# Patient Record
Sex: FEMALE | Race: WHITE | Employment: STUDENT | URBAN - NONMETROPOLITAN AREA
[De-identification: names, ages, dates, MRNs, and addresses within clinical notes are randomized per-mention and may not be internally consistent; named-entity substitution may affect disease eponyms.]

---

## 2018-10-09 ENCOUNTER — OFFICE VISIT (OUTPATIENT)
Dept: OTOLARYNGOLOGY | Facility: OTHER | Age: 23
End: 2018-10-09
Attending: PHYSICIAN ASSISTANT
Payer: COMMERCIAL

## 2018-10-09 VITALS
SYSTOLIC BLOOD PRESSURE: 120 MMHG | DIASTOLIC BLOOD PRESSURE: 74 MMHG | BODY MASS INDEX: 25.77 KG/M2 | WEIGHT: 180 LBS | TEMPERATURE: 97.4 F | OXYGEN SATURATION: 98 % | HEART RATE: 78 BPM | HEIGHT: 70 IN

## 2018-10-09 DIAGNOSIS — J30.0 NON-ALLERGIC VASOMOTOR RHINITIS: ICD-10-CM

## 2018-10-09 DIAGNOSIS — J31.0 CHRONIC RHINITIS: Primary | ICD-10-CM

## 2018-10-09 DIAGNOSIS — L30.9 DERMATITIS: ICD-10-CM

## 2018-10-09 DIAGNOSIS — H10.13 ALLERGIC CONJUNCTIVITIS, BILATERAL: ICD-10-CM

## 2018-10-09 DIAGNOSIS — R21 FACIAL RASH: ICD-10-CM

## 2018-10-09 PROCEDURE — 99213 OFFICE O/P EST LOW 20 MIN: CPT | Performed by: PHYSICIAN ASSISTANT

## 2018-10-09 RX ORDER — MONTELUKAST SODIUM 10 MG/1
10 TABLET ORAL AT BEDTIME
COMMUNITY

## 2018-10-09 RX ORDER — OLOPATADINE HYDROCHLORIDE 1 MG/ML
1 SOLUTION/ DROPS OPHTHALMIC 2 TIMES DAILY
COMMUNITY
End: 2020-06-12

## 2018-10-09 ASSESSMENT — PAIN SCALES - GENERAL: PAINLEVEL: NO PAIN (0)

## 2018-10-09 NOTE — PATIENT INSTRUCTIONS
Continue with current medications.   Return for allergy testing.   Hold Xyzal and patanol prior to allergy testing  Consider further allergy testing (food) if no improvement w/ lips.   Continue with Aquaphor and cool compresses    Thank you for allowing NAUN Sanabria and our ENT team to participate in your care.  If your medications are too expensive, please give the nurse a call.  We can possibly change this medication.  If you have a scheduling or an appointment question please contact our Health Unit Coordinator at their direct line [phone].   ALL nursing questions or concerns can be directed to your ENT nurse at: [phone] Carmen

## 2018-10-09 NOTE — LETTER
10/9/2018         RE: Hoang Gray  [address] [place] [address]        Dear Colleague,    Thank you for referring your patient, Hoang Gray, to the [facility]. Please see a copy of my visit note below.    Chief Complaint   Patient presents with     RECHECK     Follow Up Chronic Rhinitis, Seasonal Allergic Rhinitis, Allergic Conjunctivitis, Dermatitis, Facial Rash     Hoang has noticed an increase in symptoms of eye irritation, facial rashes.   She has noticed around her lips she will develop oral itching/ swelling. Hoang has rashes increased around her lips, eyes.   She was seen at [facility] and started on Prednisone and eye drops. She was further placed on Singulair as well.   She was doing well with Flonase and Xyzal and noted improvement. She has noticed symptoms improving with her eyes but no great relief with her skin.     Roommate has thieves  contributing to possible exposure. Past hx of boyfriend's cologne causing her symptoms. She did not return for MQT following her last appointment as she was improved.     Hoang has been using BID Claritin, Benadryl. She was treated with hydrocortisone cream. She has patanol for as needed symptoms. She has eyes with tearing, itching, lateral canthus with dry scaly skin. She had dry chapped lips as well. She has been using Aveeno unscented soaps.  [facility] She has worsening symptoms pending duration of exposure especially his cologne or walking down soap aisles in store.      Hoang has no hx of allergy testing. Her mother has seasonal allergies.   She is living in a dorm now and her roommate does use essential oils.       Denies oral irritation besides lips. Denies food concerns. Denies reflux. No dysphagia or dysphonia. No dyspnea.   History reviewed. No pertinent past medical history.     Allergies   Allergen Reactions     Cats      Seasonal Allergies   "    Current Outpatient Prescriptions   Medication     fluticasone (FLONASE) 50 MCG/ACT spray     Levocetirizine Dihydrochloride (XYZAL PO)     Levothyroxine Sodium (LEVOTHROID PO)     montelukast (SINGULAIR) 10 MG tablet     olopatadine (PATANOL) 0.1 % ophthalmic solution     No current facility-administered medications for this visit.       ROS: 10 point ROS neg other than the symptoms noted above in the HPI.  /74 (Cuff Size: Adult Regular)  Pulse 78  Temp 97.4  F (36.3  C) (Tympanic)  Ht 5' 10\" (1.778 m)  Wt 180 lb (81.6 kg)  SpO2 98%  BMI 25.83 kg/m2  General - The patient is well nourished and well developed, and appears to have good nutritional status.  Alert and interactive.  Head and Face - Normocephalic and atraumatic, with no gross asymmetry noted.  The facial nerve is intact.  Voice and Breathing - The patient was breathing comfortably without the use of accessory muscles. There was no wheezing or stridor.     Neck-neck is supple there is no worrisome palpable lymphadenopathy  Ears -The external auditory canals are patent, the tympanic membranes are intact without effusion or worrisome retraction    Mouth - Examination of the oral cavity showed pink, healthy oral mucosa. No lesions or ulcerations noted.  The tongue was mobile and midline.  Nose - Nasal mucosa is pink and moist with no abnormal mucus or discharge.  Throat - The palate is intact without cleft palate or obvious bifid uvula.  The tonsillar pillars and soft palate were symmetric.  Tonsils are grade 3, cryptic.        ASSESSMENT:    ICD-10-CM    1. Chronic rhinitis J31.0    2. Non-allergic vasomotor rhinitis J30.0    3. Allergic conjunctivitis, bilateral H10.13    4. Dermatitis L30.9    5. Facial rash R21            Discussed with patient, I am not able to test in relation to perfume, scents, etc. She should work on avoidance.   May need to have roommate stop use of oils, thieves for a period of time. Symptoms related to non-allergic " rhinitis.   She may return for MQT. This is being completed for seasonal allergies. She may consider additional therapy pending results, SCIT/SLIT.    May use Nasal saline and Aquaphor for nasal dryness. Use Aquaphor to lips. I am not sure of an oral treatment for lips.   Monitor for food contributing. Briefly discussed oral allergy.   Consider Astelin or patanase after allergy test.   She may also consider further steroid ointment for PRN exposure.    Consider Maxitrol for outer eye concerns  Indications for allergy testing include:    1) Confirm suspicion of allergic rhinitis due to inhalant allergies  2) Identify the offending allergen to determine specific mode of treatment  3) In the case of chronic rhinosinusitis: when symptoms are not controlled by avoidance and pharmacotherapy  4) In the Asthma patient when exacerbations may be due to perennial allergen exposure  5) Suspect food allergy  6) Otitis Media, chronic rhinitis, atopic dermatitis, Meniere disease, headache, pharyngitis or eye symptoms        Modified quantitative testing (MQT) will be performed.  Signed consent was obtained, and the risks of immunotherapy were discussed, including the potential for anaphylaxis.     If immunotherapy (IT) is recommended, there is continued risk of anaphylaxis.   Anaphylaxis can cause death. The patient will need to be monitored for 30 minutes post injection.  They must present their epinephrine pen prior to injection.  Subcutaneous as well as sublingual immunotherapy (SLIT) were discussed as potential treatment options.  The patient was told SLIT is not approved by the FDA and is cash pay.  The general time frame of immunotherapy was discussed (generally 3-5 years, sometimes longer), and the basic immunology behind IT was discussed.     Katya Alcazar PA-C  ENT  [facility] [place]  [phone]       Again, thank you for allowing me to participate in the care of your patient.        Sincerely,        Katya Alcazar  RENETTA

## 2018-10-09 NOTE — MR AVS SNAPSHOT
After Visit Summary   10/9/2018    Hoang Gray    MRN: [id number]           Patient Information     Date Of Birth          1995        Visit Information        Provider Department      10/9/2018 10:45 AM Katya Alcazar PA-C [facility]        Today's Diagnoses     Chronic rhinitis    -  1    Non-allergic vasomotor rhinitis        Allergic conjunctivitis, bilateral        Dermatitis        Facial rash          Care Instructions    Continue with current medications.   Return for allergy testing.   Hold Xyzal and patanol prior to allergy testing  Consider further allergy testing (food) if no improvement w/ lips.   Continue with Aquaphor and cool compresses    Thank you for allowing NAUN Sanabria and our ENT team to participate in your care.  If your medications are too expensive, please give the nurse a call.  We can possibly change this medication.  If you have a scheduling or an appointment question please contact our Health Unit Coordinator at their direct line [phone].   ALL nursing questions or concerns can be directed to your ENT nurse at: [phone] Carmen              Follow-ups after your visit        Who to contact     If you have questions or need follow up information about today's clinic visit or your schedule please contact [facility] directly at [phone].  Normal or non-critical lab and imaging results will be communicated to you by MyChart, letter or phone within 4 business days after the clinic has received the results. If you do not hear from us within 7 days, please contact the clinic through MyChart or phone. If you have a critical or abnormal lab result, we will notify you by phone as soon as possible.  Submit refill requests through [employer] or call your pharmacy and they will forward the refill request to us. Please allow 3 business days for your refill to be completed.          Additional Information About Your Visit       "  MyChart Information     [employer] lets you send messages to your doctor, view your test results, renew your prescriptions, schedule appointments and more. To sign up, go to [email or web address] . Click on \"Log in\" on the left side of the screen, which will take you to the Welcome page. Then click on \"Sign up Now\" on the right side of the page.     You will be asked to enter the access code listed below, as well as some personal information. Please follow the directions to create your username and password.     Your access code is: [email or web address]  Expires: 2019 10:06 AM     Your access code will  in 90 days. If you need help or a new code, please call your [facility] clinic or [phone].        Care EveryWhere ID     This is your Care EveryWhere ID. This could be used by other organizations to access your [facility] medical records  [id number]        Your Vitals Were     Pulse Temperature Height Pulse Oximetry BMI (Body Mass Index)       78 97.4  F (36.3  C) (Tympanic) 5' 10\" (1.778 m) 98% 25.83 kg/m2        Blood Pressure from Last 3 Encounters:   10/09/18 120/74   16 114/66   09/09/15 120/76    Weight from Last 3 Encounters:   10/09/18 180 lb (81.6 kg)   16 165 lb (74.8 kg) (90 %)*   02/20/15 154 lb (69.9 kg) (88 %)*     * Growth percentiles are based on CDC 2-20 Years data.              Today, you had the following     No orders found for display       Primary Care Provider Office Phone # Fax #    Cy Antunez [phone]       [facility] [address]  [facility] [address]        Equal Access to Services     [facility] YA AH: Claudia Dunn, billy randall, mita harvey, luis fernando leyva. [facility] [phone].    ATENCIÓN: Si habla español, tiene a arevalo disposición servicios gratuitos de asistencia lingüística. Llame al [phone].    We comply with applicable federal civil rights laws and [place] laws. " We do not discriminate on the basis of race, color, national origin, age, disability, sex, sexual orientation, or gender identity.            Thank you!     Thank you for choosing [facility]  for your care. Our goal is always to provide you with excellent care. Hearing back from our patients is one way we can continue to improve our services. Please take a few minutes to complete the written survey that you may receive in the mail after your visit with us. Thank you!             Your Updated Medication List - Protect others around you: Learn how to safely use, store and throw away your medicines at www.disposemymeds.org.          This list is accurate as of 10/9/18 11:02 AM.  Always use your most recent med list.                   Brand Name Dispense Instructions for use Diagnosis    fluticasone 50 MCG/ACT spray    FLONASE    1 Bottle    Spray 1-2 sprays into both nostrils daily    Chronic rhinitis, Seasonal allergic rhinitis, unspecified allergic rhinitis trigger       LEVOTHROID PO      Take 100 mcg by mouth daily.        montelukast 10 MG tablet    SINGULAIR     Take 10 mg by mouth At Bedtime        olopatadine 0.1 % ophthalmic solution    PATANOL     Place 1 drop into both eyes 2 times daily        XYZAL PO      Take 5 mg by mouth every evening

## 2018-10-09 NOTE — NURSING NOTE
"Chief Complaint   Patient presents with     RECHECK     Follow Up Chronic Rhinitis, Seasonal Allergic Rhinitis, Allergic Conjunctivitis, Dermatitis, Facial Rash       Initial /74 (Cuff Size: Adult Regular)  Pulse 78  Temp 97.4  F (36.3  C) (Tympanic)  Ht 5' 10\" (1.778 m)  Wt 180 lb (81.6 kg)  SpO2 98%  BMI 25.83 kg/m2 Estimated body mass index is 25.83 kg/(m^2) as calculated from the following:    Height as of this encounter: 5' 10\" (1.778 m).    Weight as of this encounter: 180 lb (81.6 kg).  Medication Reconciliation: complete    Celsa Guillermo LPN    "

## 2018-10-09 NOTE — NURSING NOTE
Went over instructions with patient for allergy skin testing.  Reviewed patients current medications and patient will avoid all contraindicated medications prior to MQT testing.  Patient and family verbalizes understanding.  Copy of allergy testing packet given to patient.  Patient set up appointment with [facility] for allergy skin testing and notified to call [facility] with any questions prior to testing.     Naty Combs RN

## 2018-10-09 NOTE — PROGRESS NOTES
Chief Complaint   Patient presents with     RECHECK     Follow Up Chronic Rhinitis, Seasonal Allergic Rhinitis, Allergic Conjunctivitis, Dermatitis, Facial Rash     Hoang has noticed an increase in symptoms of eye irritation, facial rashes.   She has noticed around her lips she will develop oral itching/ swelling. Hoang has rashes increased around her lips, eyes.   She was seen at [facility] and started on Prednisone and eye drops. She was further placed on Singulair as well.   She was doing well with Flonase and Xyzal and noted improvement. She has noticed symptoms improving with her eyes but no great relief with her skin.     Roommate has thieves  contributing to possible exposure. Past hx of boyfriend's cologne causing her symptoms. She did not return for MQT following her last appointment as she was improved.     Haong has been using BID Claritin, Benadryl. She was treated with hydrocortisone cream. She has patanol for as needed symptoms. She has eyes with tearing, itching, lateral canthus with dry scaly skin. She had dry chapped lips as well. She has been using Aveeno unscented soaps.  She has less symptoms since being away from [facility]. She has worsening symptoms pending duration of exposure especially his cologne or walking down soap aisles in store.      Hoang has no hx of allergy testing. Her mother has seasonal allergies.   She is living in a dorm now and her roommate does use essential oils.       Denies oral irritation besides lips. Denies food concerns. Denies reflux. No dysphagia or dysphonia. No dyspnea.   History reviewed. No pertinent past medical history.     Allergies   Allergen Reactions     Cats      Seasonal Allergies      Current Outpatient Prescriptions   Medication     fluticasone (FLONASE) 50 MCG/ACT spray     Levocetirizine Dihydrochloride (XYZAL PO)     Levothyroxine Sodium (LEVOTHROID PO)     montelukast (SINGULAIR) 10 MG tablet     olopatadine (PATANOL) 0.1 %  "ophthalmic solution     No current facility-administered medications for this visit.       ROS: 10 point ROS neg other than the symptoms noted above in the HPI.  /74 (Cuff Size: Adult Regular)  Pulse 78  Temp 97.4  F (36.3  C) (Tympanic)  Ht 5' 10\" (1.778 m)  Wt 180 lb (81.6 kg)  SpO2 98%  BMI 25.83 kg/m2  General - The patient is well nourished and well developed, and appears to have good nutritional status.  Alert and interactive.  Head and Face - Normocephalic and atraumatic, with no gross asymmetry noted.  The facial nerve is intact.  Voice and Breathing - The patient was breathing comfortably without the use of accessory muscles. There was no wheezing or stridor.     Neck-neck is supple there is no worrisome palpable lymphadenopathy  Ears -The external auditory canals are patent, the tympanic membranes are intact without effusion or worrisome retraction    Mouth - Examination of the oral cavity showed pink, healthy oral mucosa. No lesions or ulcerations noted.  The tongue was mobile and midline.  Nose - Nasal mucosa is pink and moist with no abnormal mucus or discharge.  Throat - The palate is intact without cleft palate or obvious bifid uvula.  The tonsillar pillars and soft palate were symmetric.  Tonsils are grade 3, cryptic.        ASSESSMENT:    ICD-10-CM    1. Chronic rhinitis J31.0    2. Non-allergic vasomotor rhinitis J30.0    3. Allergic conjunctivitis, bilateral H10.13    4. Dermatitis L30.9    5. Facial rash R21            Discussed with patient, I am not able to test in relation to perfume, scents, etc. She should work on avoidance.   May need to have roommate stop use of oils, thieves for a period of time. Symptoms related to non-allergic rhinitis.   She may return for MQT. This is being completed for seasonal allergies. She may consider additional therapy pending results, SCIT/SLIT.    May use Nasal saline and Aquaphor for nasal dryness. Use Aquaphor to lips. I am not sure of an oral " treatment for lips.   Monitor for food contributing. Briefly discussed oral allergy.   Consider Astelin or patanase after allergy test.   She may also consider further steroid ointment for PRN exposure.    Consider Maxitrol for outer eye concerns  Indications for allergy testing include:    1) Confirm suspicion of allergic rhinitis due to inhalant allergies  2) Identify the offending allergen to determine specific mode of treatment  3) In the case of chronic rhinosinusitis: when symptoms are not controlled by avoidance and pharmacotherapy  4) In the Asthma patient when exacerbations may be due to perennial allergen exposure  5) Suspect food allergy  6) Otitis Media, chronic rhinitis, atopic dermatitis, Meniere disease, headache, pharyngitis or eye symptoms        Modified quantitative testing (MQT) will be performed.  Signed consent was obtained, and the risks of immunotherapy were discussed, including the potential for anaphylaxis.     If immunotherapy (IT) is recommended, there is continued risk of anaphylaxis.   Anaphylaxis can cause death. The patient will need to be monitored for 30 minutes post injection.  They must present their epinephrine pen prior to injection.  Subcutaneous as well as sublingual immunotherapy (SLIT) were discussed as potential treatment options.  The patient was told SLIT is not approved by the FDA and is cash pay.  The general time frame of immunotherapy was discussed (generally 3-5 years, sometimes longer), and the basic immunology behind IT was discussed.     Katya Alcazar PA-C  [facility] [place]  [phone]

## 2018-11-20 ENCOUNTER — OFFICE VISIT (OUTPATIENT)
Dept: OTOLARYNGOLOGY | Facility: OTHER | Age: 23
End: 2018-11-20
Attending: PHYSICIAN ASSISTANT
Payer: COMMERCIAL

## 2018-11-20 ENCOUNTER — OFFICE VISIT (OUTPATIENT)
Dept: ALLERGY | Facility: OTHER | Age: 23
End: 2018-11-20
Attending: PHYSICIAN ASSISTANT
Payer: COMMERCIAL

## 2018-11-20 VITALS
SYSTOLIC BLOOD PRESSURE: 126 MMHG | HEIGHT: 70 IN | OXYGEN SATURATION: 99 % | DIASTOLIC BLOOD PRESSURE: 84 MMHG | TEMPERATURE: 98 F | WEIGHT: 180 LBS | BODY MASS INDEX: 25.77 KG/M2 | HEART RATE: 76 BPM

## 2018-11-20 DIAGNOSIS — J30.2 SEASONAL ALLERGIC RHINITIS, UNSPECIFIED TRIGGER: ICD-10-CM

## 2018-11-20 DIAGNOSIS — T78.40XD ALLERGIC REACTION, SUBSEQUENT ENCOUNTER: ICD-10-CM

## 2018-11-20 DIAGNOSIS — R21 FACIAL RASH: ICD-10-CM

## 2018-11-20 DIAGNOSIS — J31.0 CHRONIC RHINITIS: Primary | ICD-10-CM

## 2018-11-20 DIAGNOSIS — H10.13 ALLERGIC CONJUNCTIVITIS, BILATERAL: ICD-10-CM

## 2018-11-20 DIAGNOSIS — J30.0 NON-ALLERGIC VASOMOTOR RHINITIS: ICD-10-CM

## 2018-11-20 DIAGNOSIS — L30.9 DERMATITIS: ICD-10-CM

## 2018-11-20 LAB — MISCELLANEOUS TEST: NORMAL

## 2018-11-20 PROCEDURE — 86003 ALLG SPEC IGE CRUDE XTRC EA: CPT | Mod: 90 | Performed by: PHYSICIAN ASSISTANT

## 2018-11-20 PROCEDURE — 99213 OFFICE O/P EST LOW 20 MIN: CPT | Performed by: PHYSICIAN ASSISTANT

## 2018-11-20 PROCEDURE — 99000 SPECIMEN HANDLING OFFICE-LAB: CPT | Performed by: PHYSICIAN ASSISTANT

## 2018-11-20 PROCEDURE — 95004 PERQ TESTS W/ALRGNC XTRCS: CPT

## 2018-11-20 PROCEDURE — 95024 IQ TESTS W/ALLERGENIC XTRCS: CPT

## 2018-11-20 RX ORDER — AZELASTINE HYDROCHLORIDE, FLUTICASONE PROPIONATE 137; 50 UG/1; UG/1
1 SPRAY, METERED NASAL 2 TIMES DAILY
Qty: 2 BOTTLE | Refills: 11 | Status: SHIPPED | OUTPATIENT
Start: 2018-11-20 | End: 2020-06-12

## 2018-11-20 RX ORDER — EPINEPHRINE 0.3 MG/.3ML
0.3 INJECTION SUBCUTANEOUS PRN
Qty: 0.6 ML | Refills: 1 | Status: SHIPPED | OUTPATIENT
Start: 2018-11-20 | End: 2020-05-29

## 2018-11-20 ASSESSMENT — PAIN SCALES - GENERAL: PAINLEVEL: NO PAIN (0)

## 2018-11-20 NOTE — MR AVS SNAPSHOT
After Visit Summary   11/20/2018    Hoang Gray    MRN: [id number]           Patient Information     Date Of Birth          1995        Visit Information        Provider Department      11/20/2018 3:45 PM Katya Alcazar PA-C [facility]        Today's Diagnoses     Chronic rhinitis    -  1    Seasonal allergic rhinitis, unspecified trigger        Non-allergic vasomotor rhinitis        Allergic conjunctivitis, bilateral        Dermatitis        Facial rash        Allergic reaction, subsequent encounter          Care Instructions    Start allergy drops.   Start Dymista 1 spray twice a day  Continue with xyzal, Singulair.     Return in 3-6 months after starting drops.     Thank you for allowing NAUN Sanabria and our ENT team to participate in your care.  If your medications are too expensive, please give the nurse a call.  We can possibly change this medication.  If you have a scheduling or an appointment question please contact our Health Unit Coordinator at their direct line [phone].   ALL nursing questions or concerns can be directed to your ENT nurse at: [phone] Carmen              Follow-ups after your visit        Who to contact     If you have questions or need follow up information about today's clinic visit or your schedule please contact [facility] directly at [phone].  Normal or non-critical lab and imaging results will be communicated to you by MyChart, letter or phone within 4 business days after the clinic has received the results. If you do not hear from us within 7 days, please contact the clinic through MyChart or phone. If you have a critical or abnormal lab result, we will notify you by phone as soon as possible.  Submit refill requests through [employer] or call your pharmacy and they will forward the refill request to us. Please allow 3 business days for your refill to be completed.          Additional Information About  "Your Visit        Care EveryWhere ID     This is your Care EveryWhere ID. This could be used by other organizations to access your [facility] medical records  [id number]        Your Vitals Were     Pulse Temperature Height Pulse Oximetry BMI (Body Mass Index)       76 98  F (36.7  C) (Oral) 1.778 m (5' 10\") 99% 25.83 kg/m2        Blood Pressure from Last 3 Encounters:   11/20/18 126/84   10/09/18 120/74   12/30/16 114/66    Weight from Last 3 Encounters:   11/20/18 81.6 kg (180 lb)   10/09/18 81.6 kg (180 lb)   12/30/16 74.8 kg (165 lb) (90 %)*     * Growth percentiles are based on [facility] 2-20 Years data.              Today, you had the following     No orders found for display         Today's Medication Changes          These changes are accurate as of 11/20/18  3:57 PM.  If you have any questions, ask your nurse or doctor.               Start taking these medicines.        Dose/Directions    azelastine-fluticasone 137-50 MCG/ACT nasal spray   Commonly known as:  DYMISTA   Used for:  Chronic rhinitis, Seasonal allergic rhinitis, unspecified trigger   Started by:  Katya Alcazar PA-C        Dose:  1 spray   Spray 1 spray into both nostrils 2 times daily   Quantity:  2 Bottle   Refills:  11       EPINEPHrine 0.3 MG/0.3ML injection 2-pack   Commonly known as:  EPIPEN/ADRENACLICK/or ANY BX GENERIC EQUIV   Used for:  Allergic reaction, subsequent encounter   Started by:  Katya Alcazar PA-C        Dose:  0.3 mg   Inject 0.3 mLs (0.3 mg) into the muscle as needed for anaphylaxis   Quantity:  0.6 mL   Refills:  1            Where to get your medicines      These medications were sent to [facility] - [address]  [address] [place] [address]     Phone:  [phone]     azelastine-fluticasone 137-50 MCG/ACT nasal spray    EPINEPHrine 0.3 MG/0.3ML injection 2-pack                Primary Care Provider Office Phone # Fax #    Cy Antunez [phone]       [facility] " [place] [address] [address]        Equal Access to Services     ESECAROLINA YA : Hadii destinee reeves hadpemajass Soserenityali, waaxda luqadaha, qakentrellta kaidaliada diegojamigia, luis fernando dorsey haygiselle merinojulietagrayson leyva. So [facility] [phone].    ATENCIÓN: Si habla español, tiene a arevalo disposición servicios gratuitos de asistencia lingüística. Llame al [phone].    We comply with applicable federal civil rights laws and [place] laws. We do not discriminate on the basis of race, color, national origin, age, disability, sex, sexual orientation, or gender identity.            Thank you!     Thank you for choosing [facility]  for your care. Our goal is always to provide you with excellent care. Hearing back from our patients is one way we can continue to improve our services. Please take a few minutes to complete the written survey that you may receive in the mail after your visit with us. Thank you!             Your Updated Medication List - Protect others around you: Learn how to safely use, store and throw away your medicines at www.disposemymeds.org.          This list is accurate as of 11/20/18  3:57 PM.  Always use your most recent med list.                   Brand Name Dispense Instructions for use Diagnosis    azelastine-fluticasone 137-50 MCG/ACT nasal spray    DYMISTA    2 Bottle    Spray 1 spray into both nostrils 2 times daily    Chronic rhinitis, Seasonal allergic rhinitis, unspecified trigger       EPINEPHrine 0.3 MG/0.3ML injection 2-pack    EPIPEN/ADRENACLICK/or ANY BX GENERIC EQUIV    0.6 mL    Inject 0.3 mLs (0.3 mg) into the muscle as needed for anaphylaxis    Allergic reaction, subsequent encounter       fluticasone 50 MCG/ACT spray    FLONASE    1 Bottle    Spray 1-2 sprays into both nostrils daily    Chronic rhinitis, Seasonal allergic rhinitis, unspecified allergic rhinitis trigger       LEVOTHROID PO      Take 100 mcg by mouth daily.        montelukast 10 MG tablet    SINGULAIR      Take 10 mg by mouth At Bedtime        olopatadine 0.1 % ophthalmic solution    PATANOL     Place 1 drop into both eyes 2 times daily        XYZAL PO      Take 5 mg by mouth every evening

## 2018-11-20 NOTE — PATIENT INSTRUCTIONS
Start allergy drops.   Start Dymista 1 spray twice a day  Continue with xyzal, Singulair.     Return in 3-6 months after starting drops.     Thank you for allowing NAUN Sanabria and our ENT team to participate in your care.  If your medications are too expensive, please give the nurse a call.  We can possibly change this medication.  If you have a scheduling or an appointment question please contact our Health Unit Coordinator at their direct line [phone].   ALL nursing questions or concerns can be directed to your ENT nurse at: [phone] Carmen

## 2018-11-20 NOTE — PROGRESS NOTES
Hoang was seen 11/20/18  for allergy skin testing. Patient was seen by this nurse in conjunction with ENT provider. All encounter details are documented in ENT Provider's appointment from this same date. Please see referenced encounter for this visits documentation.   Radha Rod

## 2018-11-20 NOTE — LETTER
"    11/20/2018         RE: Hoang Gray  [address] [place] [address]        Dear Colleague,    Thank you for referring your patient, Hoang Gray, to the [facility] [place] [facility] [place]. Please see a copy of my visit note below.    Chief Complaint   Patient presents with     Other     MQT allergy skin testing       Patient presents for follow up and has noted several visits due to ongoing concerns.       Dilution #6- Pigweed, grass, birch, oak, pema, walnut, alternia, aspergillus, fusarium, cat, dust.   Dilution #5- ragweed, thistle, maple, elm, pine, cottonwood, molds, dog.       History reviewed. No pertinent past medical history.     Allergies   Allergen Reactions     Cats      Seasonal Allergies      Current Outpatient Prescriptions   Medication     fluticasone (FLONASE) 50 MCG/ACT spray     Levocetirizine Dihydrochloride (XYZAL PO)     Levothyroxine Sodium (LEVOTHROID PO)     montelukast (SINGULAIR) 10 MG tablet     olopatadine (PATANOL) 0.1 % ophthalmic solution     No current facility-administered medications for this visit.       ROS: 10 point ROS neg other than the symptoms noted above in the HPI.  /84 (BP Location: Right arm, Patient Position: Sitting, Cuff Size: Adult Regular)  Pulse 76  Temp 98  F (36.7  C) (Oral)  Ht 1.778 m (5' 10\")  Wt 81.6 kg (180 lb)  SpO2 99%  BMI 25.83 kg/m2  General - The patient is well nourished and well developed, and appears to have good nutritional status.  Alert and interactive.  Head and Face - Normocephalic and atraumatic, with no gross asymmetry noted.  The facial nerve is intact.  Voice and Breathing - The patient was breathing comfortably without the use of accessory muscles. There was no wheezing or stridor.     Neck-neck is supple there is no worrisome palpable lymphadenopathy  Ears -The external auditory canals are patent, the tympanic membranes are intact without effusion or worrisome retraction    Mouth - Examination of the oral cavity " showed pink, healthy oral mucosa. No lesions or ulcerations noted.  The tongue was mobile and midline.  Nose - Nasal mucosa is pink and moist with no abnormal mucus or discharge.  Throat - The palate is intact without cleft palate or obvious bifid uvula.  The tonsillar pillars and soft palate were symmetric.  Tonsils are grade 3, cryptic    ASSESSMENT:    ICD-10-CM    1. Chronic rhinitis J31.0 azelastine-fluticasone (DYMISTA) 137-50 MCG/ACT nasal spray     Food Panel Dr. Larsen: Laboratory Miscellaneous Order     Send outs misc test   2. Seasonal allergic rhinitis, unspecified trigger J30.2 azelastine-fluticasone (DYMISTA) 137-50 MCG/ACT nasal spray   3. Non-allergic vasomotor rhinitis J30.0    4. Allergic conjunctivitis, bilateral H10.13    5. Dermatitis L30.9 Food Panel Dr. Larsen: Laboratory Miscellaneous Order   6. Facial rash R21 Food Panel Dr. Larsen: Laboratory Miscellaneous Order   7. Allergic reaction, subsequent encounter T78.40XD EPINEPHrine (EPIPEN/ADRENACLICK/OR ANY BX GENERIC EQUIV) 0.3 MG/0.3ML injection 2-pack     Start allergy drops.   Start Dymista 1 spray twice a day  Continue with xyzal, Singulair.     Return in 3-6 months after starting drops.       If immunotherapy (IT) is recommended, there is continued risk of anaphylaxis.   Anaphylaxis can cause death. The patient will need to be monitored for 30 minutes post injection.  They must present their epinephrine pen prior to injection.  Subcutaneous as well as sublingual immunotherapy (SLIT) were discussed as potential treatment options.  The patient was told SLIT is not approved by the FDA and is cash pay.  The general time frame of immunotherapy was discussed (generally 3-5 years, sometimes longer), and the basic immunology behind IT was discussed.      Katya Alcazar PA-C  ENT  [facility], [place]  [phone]      Again, thank you for allowing me to participate in the care of your patient.        Sincerely,        Katya Alcazar  RENETTA

## 2018-11-20 NOTE — PROGRESS NOTES
"Chief Complaint   Patient presents with     Other     MQT allergy skin testing       Patient presents for follow up and has noted several visits due to ongoing concerns.       Dilution #6- Pigweed, grass, birch, oak, pema, walnut, alternia, aspergillus, fusarium, cat, dust.   Dilution #5- ragweed, thistle, maple, elm, pine, cottonwood, molds, dog.       History reviewed. No pertinent past medical history.     Allergies   Allergen Reactions     Cats      Seasonal Allergies      Current Outpatient Prescriptions   Medication     fluticasone (FLONASE) 50 MCG/ACT spray     Levocetirizine Dihydrochloride (XYZAL PO)     Levothyroxine Sodium (LEVOTHROID PO)     montelukast (SINGULAIR) 10 MG tablet     olopatadine (PATANOL) 0.1 % ophthalmic solution     No current facility-administered medications for this visit.       ROS: 10 point ROS neg other than the symptoms noted above in the HPI.  /84 (BP Location: Right arm, Patient Position: Sitting, Cuff Size: Adult Regular)  Pulse 76  Temp 98  F (36.7  C) (Oral)  Ht 1.778 m (5' 10\")  Wt 81.6 kg (180 lb)  SpO2 99%  BMI 25.83 kg/m2  General - The patient is well nourished and well developed, and appears to have good nutritional status.  Alert and interactive.  Head and Face - Normocephalic and atraumatic, with no gross asymmetry noted.  The facial nerve is intact.  Voice and Breathing - The patient was breathing comfortably without the use of accessory muscles. There was no wheezing or stridor.     Neck-neck is supple there is no worrisome palpable lymphadenopathy  Ears -The external auditory canals are patent, the tympanic membranes are intact without effusion or worrisome retraction    Mouth - Examination of the oral cavity showed pink, healthy oral mucosa. No lesions or ulcerations noted.  The tongue was mobile and midline.  Nose - Nasal mucosa is pink and moist with no abnormal mucus or discharge.  Throat - The palate is intact without cleft palate or obvious bifid " uvula.  The tonsillar pillars and soft palate were symmetric.  Tonsils are grade 3, cryptic    ASSESSMENT:    ICD-10-CM    1. Chronic rhinitis J31.0 azelastine-fluticasone (DYMISTA) 137-50 MCG/ACT nasal spray     Food Panel Dr. Larsen: Laboratory Miscellaneous Order     Send outs misc test   2. Seasonal allergic rhinitis, unspecified trigger J30.2 azelastine-fluticasone (DYMISTA) 137-50 MCG/ACT nasal spray   3. Non-allergic vasomotor rhinitis J30.0    4. Allergic conjunctivitis, bilateral H10.13    5. Dermatitis L30.9 Food Panel Dr. Larsen: Laboratory Miscellaneous Order   6. Facial rash R21 Food Panel Dr. Larsen: Laboratory Miscellaneous Order   7. Allergic reaction, subsequent encounter T78.40XD EPINEPHrine (EPIPEN/ADRENACLICK/OR ANY BX GENERIC EQUIV) 0.3 MG/0.3ML injection 2-pack     Start allergy drops.   Start Dymista 1 spray twice a day  Continue with xyzal, Singulair.     Return in 3-6 months after starting drops.       If immunotherapy (IT) is recommended, there is continued risk of anaphylaxis.   Anaphylaxis can cause death. The patient will need to be monitored for 30 minutes post injection.  They must present their epinephrine pen prior to injection.  Subcutaneous as well as sublingual immunotherapy (SLIT) were discussed as potential treatment options.  The patient was told SLIT is not approved by the FDA and is cash pay.  The general time frame of immunotherapy was discussed (generally 3-5 years, sometimes longer), and the basic immunology behind IT was discussed.      Katya Alcazar PA-C  ENT  [facility], [place]  [phone]

## 2018-11-20 NOTE — NURSING NOTE
"Chief Complaint   Patient presents with     Other     MQT allergy skin testing       Initial /84 (BP Location: Right arm, Patient Position: Sitting, Cuff Size: Adult Regular)  Pulse 76  Temp 98  F (36.7  C) (Oral)  Ht 5' 10\" (1.778 m)  Wt 180 lb (81.6 kg)  SpO2 99%  BMI 25.83 kg/m2 Estimated body mass index is 25.83 kg/(m^2) as calculated from the following:    Height as of this encounter: 5' 10\" (1.778 m).    Weight as of this encounter: 180 lb (81.6 kg).  Medication Reconciliation: complete     Prior to testing, the patient's identity is verified using name and date of birth.  Hoang presents for allergy skin testing with her mother Lisset.      The patient's symptoms have included nasal and head congestion, itchy eyes and red rashy skin, especially on her face and around her eye.  Her skin does become itchy when this rash is present.  Hoang states her symptoms have been present since childhood, but became much worse about 2 years ago when she started college and had a boyfriend who wore strong cologne. She also had a room mate who used a lot of perfume and  that seemed to make her symptoms worse.  She has noted them to be bothersome all year, for the last year.      The patient has lived in a college dorm for the last couple years, and was actually moved to a single dorm suite due to her allergies.    She has 2 inside dogs at home that sleep with her.      The patient denies allergy testing in the past.    All of the patients medications are reviewed prior to testing.  All appropriate medications have been stopped.         Consent is signed by the patient and signature is verified.    MQT/ID test is performed per protocol.  The patient tolerated testing well.  Benadryl cooling gel is applied to all test sites, and the 2 children's chewable Claritin are administered to the patient; both per standing orders for post test itching.    All findings are recorded on the paper flow sheet. Results are " reviewed with the patient.  She is given written information regarding allergy.      The patient will follow-up with JESSICA Sanabria for treatment plan.      YOJANA Nagy RN

## 2018-11-21 ENCOUNTER — TELEPHONE (OUTPATIENT)
Dept: ALLERGY | Facility: OTHER | Age: 23
End: 2018-11-21

## 2018-11-21 DIAGNOSIS — J31.0 CHRONIC RHINITIS: Primary | ICD-10-CM

## 2018-12-04 ENCOUNTER — TELEPHONE (OUTPATIENT)
Dept: OTOLARYNGOLOGY | Facility: OTHER | Age: 23
End: 2018-12-04

## 2018-12-04 NOTE — TELEPHONE ENCOUNTER
I left a message for pt to call back for her food allergy rast test results.  She got a 1 in cow's milk, pistachio, peanut, and wheat.

## 2018-12-21 ENCOUNTER — ALLIED HEALTH/NURSE VISIT (OUTPATIENT)
Dept: ALLERGY | Facility: OTHER | Age: 23
End: 2018-12-21
Attending: PHYSICIAN ASSISTANT
Payer: COMMERCIAL

## 2018-12-21 DIAGNOSIS — J31.0 CHRONIC RHINITIS: Primary | ICD-10-CM

## 2018-12-21 DIAGNOSIS — J30.2 SEASONAL ALLERGIC RHINITIS, UNSPECIFIED TRIGGER: ICD-10-CM

## 2018-12-21 RX ORDER — MONTELUKAST SODIUM 10 MG/1
10 TABLET ORAL AT BEDTIME
Qty: 90 TABLET | Refills: 3 | Status: CANCELLED | OUTPATIENT
Start: 2018-12-21 | End: 2019-12-21

## 2018-12-21 RX ORDER — MONTELUKAST SODIUM 10 MG/1
10 TABLET ORAL AT BEDTIME
Qty: 90 TABLET | Refills: 1 | Status: SHIPPED | OUTPATIENT
Start: 2018-12-21 | End: 2020-06-12

## 2018-12-21 NOTE — TELEPHONE ENCOUNTER
Patient was here today for SLIT education and first dose. While here, she requested a refill on Singulair.  Medication is pended if you approve.  Thank you.    rCys Olivier RN

## 2018-12-21 NOTE — PROGRESS NOTES
Prior to education, verified patient identity using patient's name and date of birth.    Patient is here for education and  SLIT drops and first administration.  All instructions for SLIT use are reviewed with the patient.  Signs and symptoms of anaphylaxis both local and systemic are discussed.  Patient verbalized understanding.  Patient is taught how to use EPI pen and a return demonstration is given.  First administration of SLIT is done by patient without incident.  Patient has EPI pen with today.  Patient is aware that a follow up is needed in 6 months and may call us for refills of SLIT when it starts to run low.      Crys Olivier RN

## 2018-12-26 ENCOUNTER — HOSPITAL ENCOUNTER (EMERGENCY)
Facility: HOSPITAL | Age: 23
End: 2018-12-26
Payer: COMMERCIAL

## 2019-03-05 ENCOUNTER — TELEPHONE (OUTPATIENT)
Dept: OTOLARYNGOLOGY | Facility: OTHER | Age: 24
End: 2019-03-05

## 2019-03-05 NOTE — TELEPHONE ENCOUNTER
[facility] requests a refill of SLIT drops.  This will be done after a signature is obtained from the ENT provider.    Her last follow-up visit was 11/2018 with JESSICA Sanabria.  The patient is not due for an appointment at this time.    I LM for Hoang to call, to see how she is doing on the drops.  She has current epi pens, and will need to follow-up in late May.  Radha Rod RN

## 2019-11-08 DIAGNOSIS — J31.0 CHRONIC RHINITIS: ICD-10-CM

## 2019-11-08 NOTE — TELEPHONE ENCOUNTER
SLIT refill request received from [facility].  Patient's last refill was 3/5/19.  Patient thinks the vial was done in June.  She has had no further refills.  She was on her 2nd vial of building.      Patient reports her current symptoms include itchy eyes and frequent sneezing.  No sinus infections, hives, breathing issues, etc.  She takes a daily antihistamine.  She has not been taking her Singulair or using Flonase.  She does use antihistamine eye drops to relieve her itchy eyes.    Please advise if you would like patient to repeat her last vial with a slow build or advance dilution with a slow build, etc.  Patient is aware a message will be routed for recommendations.    Patient is also aware she is due for a follow-up. Last office visit was 11/20/18.  If she should be seen before refilling SLIT, please advise.  Thank you.    Crys Olivier RN

## 2019-11-13 NOTE — TELEPHONE ENCOUNTER
Spoke with patient.  Advised patient will repeat last vial dilution.  Explained slow build:  1 drop daily x 1 week, then 2 drops daily x 1 week, then 3 drops daily ongoing.  Also advised patient should take her antihistamine and Singulair daily.  Patient verbalized understanding.  Refill faxed to [facility].    Crys Olivier RN

## 2020-05-28 DIAGNOSIS — T78.40XD ALLERGIC REACTION, SUBSEQUENT ENCOUNTER: ICD-10-CM

## 2020-05-28 NOTE — TELEPHONE ENCOUNTER
Spoke with patient regarding SLIT refill.  Last refill was 11/13/19.  Patient believes her last dose would have been around mid-February.  She tolerated the vial with no issues.  She takes an antihistamine and Singulair daily.  She does not note significant symptoms at this time.  Patient is aware she will need a follow-up as she was last seen in ENT on 11/20/18.  She states her epi-pens are current.    Please advise if patient should repeat last vial or advance one dilution with this refill.  Should patient do a slow build?  Thank you.    Crys Olivier, RN

## 2020-05-29 RX ORDER — EPINEPHRINE 0.3 MG/.3ML
0.3 INJECTION SUBCUTANEOUS PRN
Qty: 0.6 ML | Refills: 0 | Status: SHIPPED | OUTPATIENT
Start: 2020-05-29

## 2020-05-29 NOTE — TELEPHONE ENCOUNTER
Called patient to advise paperwork was faxed to [facility] for her refill.  Patient is to do one drop, three times daily.  Explained if she has any issues to reduce to one drop daily x 7 days, then one drop twice daily x 7 days, then one drop three times daily ongoing.      Patient stated she realized her epi-pens are  and has requested a new prescription be sent to [facility].  Patient also scheduled for a telephone visit with Trina Cerna CNP, on 20.      Crys Olivier RN

## 2020-05-29 NOTE — TELEPHONE ENCOUNTER
Per note from Dr. Larsen, continue with 1 drop three times daily.  Paperwork faxed to [facility] for processing.    Crys Olivier RN

## 2020-06-10 NOTE — PATIENT INSTRUCTIONS
Continue build up on allergy drops  Continue xyzal, singulair, astelin, patanol as needed for symptoms  Follow up in 12 months for recheck, sooner if symptoms worsen or concerns develop    Thank you for allowing Trina Cerna NP and our ENT team to participate in your care.  If your medications are too expensive, please give the nurse a call.  We can possibly change this medication.  If you have a scheduling or an appointment question please contact our Health Unit Coordinator at their direct line [phone].   ALL nursing questions or concerns can be directed to your ENT Nurse--Audra: [phone]

## 2020-06-10 NOTE — PROGRESS NOTES
"Hoang Gray is a 24 year old female who is being evaluated via a billable telephone visit.      The patient has been notified of following:     \"This telephone visit will be conducted via a call between you and your physician/provider. We have found that certain health care needs can be provided without the need for a physical exam.  This service lets us provide the care you need with a short phone conversation.  If a prescription is necessary we can send it directly to your pharmacy.  If lab work is needed we can place an order for that and you can then stop by our lab to have the test done at a later time.    Telephone visits are billed at different rates depending on your insurance coverage. During this emergency period, for some insurers they may be billed the same as an in-person visit.  Please reach out to your insurance provider with any questions.    If during the course of the call the physician/provider feels a telephone visit is not appropriate, you will not be charged for this service.\"    Patient has given verbal consent for Telephone visit?  Yes    What phone number would you like to be contacted at? [phone]    How would you like to obtain your AVS? Mail a copy    Chief Complaint   Patient presents with     Allergies     Follow Up SLIT       Hoang Gray presents for a 18 month follow up for SLIT.  She continues with SLIT, has had to take some breaks due to financial concerns with paying for drops.  She is currently on 4th vial.    She started back in mid May and staying pretty consistent  Has been doing well with drops.  Denies oral itching/swelling  Has noticed improvement with allergy symptoms.  She has recently camped without significant allergy symptoms   Has been using xyzal, astelin, pataday and singulair for symptomatic relief and has noticed relief since starting medication.       No flory sinusitis  No recent illnesses or sinus infections    Epi pen is up to date    MQT " 11/20/2018:  Dilution #6- Pigweed, grass, birch, oak, pema, walnut, alternia, aspergillus, fusarium, cat, dust.   Dilution #5- ragweed, thistle, maple, elm, pine, cottonwood, molds, dog.    Allergies   Allergen Reactions     Cats      Seasonal Allergies      Current Outpatient Medications   Medication     azelastine-fluticasone (DYMISTA) 137-50 MCG/ACT nasal spray     levocetirizine (XYZAL) 5 MG tablet     Levothyroxine Sodium (LEVOTHROID PO)     montelukast (SINGULAIR) 10 MG tablet     norethindrone-ethinyl estradiol (MICROGESTIN 1.5/30) 1.5-30 MG-MCG tablet     olopatadine (PATANOL) 0.1 % ophthalmic solution     SUBLINGUAL IMMUNOTHERAPY, SLIT,     EPINEPHrine (ANY BX GENERIC EQUIV) 0.3 MG/0.3ML injection 2-pack     montelukast (SINGULAIR) 10 MG tablet     No current facility-administered medications for this visit.      History reviewed. No pertinent past medical history.  History reviewed. No pertinent surgical history.  Social History     Tobacco Use     Smoking status: Never Smoker     Smokeless tobacco: Never Used   Substance Use Topics     Alcohol use: No     Comment: occasional social     Drug use: No       General - The patient is alert and oriented to person and place, answers questions and cooperates with telephone appointment appropriately.   Voice and Breathing - The patient was breathing comfortably, talking in full sentences.  There was no audible wheezing, stridor, or stertor.  The patients voice was clear and strong, and had appropriate pitch and quality.    ASSESSMENT:      ICD-10-CM    1. Chronic rhinitis  J31.0 montelukast (SINGULAIR) 10 MG tablet     azelastine-fluticasone (DYMISTA) 137-50 MCG/ACT nasal spray   2. Seasonal allergic rhinitis, unspecified trigger  J30.2 montelukast (SINGULAIR) 10 MG tablet     azelastine-fluticasone (DYMISTA) 137-50 MCG/ACT nasal spray     olopatadine (PATANOL) 0.1 % ophthalmic solution     levocetirizine (XYZAL) 5 MG tablet     Continue build up on allergy  drops  Continue xyzal, singulair, astelin, patanol as needed for symptoms  Follow up in 12 months for recheck, sooner if symptoms worsen or concerns develop    Trina PRITCHARD  10:13 AM  June 12, 2020    Phone call duration: 9 minutes

## 2020-06-12 ENCOUNTER — VIRTUAL VISIT (OUTPATIENT)
Dept: OTOLARYNGOLOGY | Facility: OTHER | Age: 25
End: 2020-06-12
Attending: NURSE PRACTITIONER
Payer: COMMERCIAL

## 2020-06-12 VITALS — WEIGHT: 175 LBS | BODY MASS INDEX: 25.05 KG/M2 | HEIGHT: 70 IN

## 2020-06-12 DIAGNOSIS — J31.0 CHRONIC RHINITIS: ICD-10-CM

## 2020-06-12 DIAGNOSIS — J30.2 SEASONAL ALLERGIC RHINITIS, UNSPECIFIED TRIGGER: ICD-10-CM

## 2020-06-12 PROCEDURE — 99213 OFFICE O/P EST LOW 20 MIN: CPT | Mod: 95 | Performed by: NURSE PRACTITIONER

## 2020-06-12 RX ORDER — AZELASTINE HYDROCHLORIDE, FLUTICASONE PROPIONATE 137; 50 UG/1; UG/1
1 SPRAY, METERED NASAL 2 TIMES DAILY
Qty: 2 BOTTLE | Refills: 11 | Status: SHIPPED | OUTPATIENT
Start: 2020-06-12

## 2020-06-12 RX ORDER — MONTELUKAST SODIUM 10 MG/1
10 TABLET ORAL AT BEDTIME
Qty: 90 TABLET | Refills: 3 | Status: SHIPPED | OUTPATIENT
Start: 2020-06-12

## 2020-06-12 RX ORDER — LEVOCETIRIZINE DIHYDROCHLORIDE 5 MG/1
5 TABLET, FILM COATED ORAL EVERY EVENING
Qty: 90 TABLET | Refills: 3 | Status: SHIPPED | OUTPATIENT
Start: 2020-06-12

## 2020-06-12 RX ORDER — OLOPATADINE HYDROCHLORIDE 1 MG/ML
1 SOLUTION/ DROPS OPHTHALMIC 2 TIMES DAILY
Qty: 1 BOTTLE | Refills: 3 | Status: SHIPPED | OUTPATIENT
Start: 2020-06-12

## 2020-06-12 RX ORDER — NORETHINDRONE ACETATE AND ETHINYL ESTRADIOL .03; 1.5 MG/1; MG/1
1 TABLET ORAL DAILY
COMMUNITY

## 2020-06-12 ASSESSMENT — PAIN SCALES - GENERAL: PAINLEVEL: NO PAIN (0)

## 2020-06-12 ASSESSMENT — MIFFLIN-ST. JEOR: SCORE: 1634.04

## 2020-06-12 NOTE — LETTER
"    6/12/2020         RE: Hoang Gray  [address] [place] [address]        Dear Colleague,    Thank you for referring your patient, Hoang Gray, to the [facility]. Please see a copy of my visit note below.    Hoang Gray is a 24 year old female who is being evaluated via a billable telephone visit.      The patient has been notified of following:     \"This telephone visit will be conducted via a call between you and your physician/provider. We have found that certain health care needs can be provided without the need for a physical exam.  This service lets us provide the care you need with a short phone conversation.  If a prescription is necessary we can send it directly to your pharmacy.  If lab work is needed we can place an order for that and you can then stop by our lab to have the test done at a later time.    Telephone visits are billed at different rates depending on your insurance coverage. During this emergency period, for some insurers they may be billed the same as an in-person visit.  Please reach out to your insurance provider with any questions.    If during the course of the call the physician/provider feels a telephone visit is not appropriate, you will not be charged for this service.\"    Patient has given verbal consent for Telephone visit?  Yes    What phone number would you like to be contacted at? [phone]    How would you like to obtain your AVS? Mail a copy    Chief Complaint   Patient presents with     Allergies     Follow Up SLIT       Hoang Gray presents for a 18 month follow up for SLIT.  She continues with SLIT, has had to take some breaks due to financial concerns with paying for drops.  She is currently on 4th vial.    She started back in mid May and staying pretty consistent  Has been doing well with drops.  Denies oral itching/swelling  Has noticed improvement with allergy symptoms.  She has recently camped without significant " allergy symptoms   Has been using xyzal, astelin, pataday and singulair for symptomatic relief and has noticed relief since starting medication.       No flory sinusitis  No recent illnesses or sinus infections    Epi pen is up to date    T 11/20/2018:  Dilution #6- Pigweed, grass, birch, oak, pema, walnut, alternia, aspergillus, fusarium, cat, dust.   Dilution #5- ragweed, thistle, maple, elm, pine, cottonwood, molds, dog.    Allergies   Allergen Reactions     Cats      Seasonal Allergies      Current Outpatient Medications   Medication     azelastine-fluticasone (DYMISTA) 137-50 MCG/ACT nasal spray     levocetirizine (XYZAL) 5 MG tablet     Levothyroxine Sodium (LEVOTHROID PO)     montelukast (SINGULAIR) 10 MG tablet     norethindrone-ethinyl estradiol (MICROGESTIN 1.5/30) 1.5-30 MG-MCG tablet     olopatadine (PATANOL) 0.1 % ophthalmic solution     SUBLINGUAL IMMUNOTHERAPY, SLIT,     EPINEPHrine (ANY BX GENERIC EQUIV) 0.3 MG/0.3ML injection 2-pack     montelukast (SINGULAIR) 10 MG tablet     No current facility-administered medications for this visit.      History reviewed. No pertinent past medical history.  History reviewed. No pertinent surgical history.  Social History     Tobacco Use     Smoking status: Never Smoker     Smokeless tobacco: Never Used   Substance Use Topics     Alcohol use: No     Comment: occasional social     Drug use: No       General - The patient is alert and oriented to person and place, answers questions and cooperates with telephone appointment appropriately.   Voice and Breathing - The patient was breathing comfortably, talking in full sentences.  There was no audible wheezing, stridor, or stertor.  The patients voice was clear and strong, and had appropriate pitch and quality.    ASSESSMENT:      ICD-10-CM    1. Chronic rhinitis  J31.0 montelukast (SINGULAIR) 10 MG tablet     azelastine-fluticasone (DYMISTA) 137-50 MCG/ACT nasal spray   2. Seasonal allergic rhinitis, unspecified  trigger  J30.2 montelukast (SINGULAIR) 10 MG tablet     azelastine-fluticasone (DYMISTA) 137-50 MCG/ACT nasal spray     olopatadine (PATANOL) 0.1 % ophthalmic solution     levocetirizine (XYZAL) 5 MG tablet     Continue build up on allergy drops  Continue xyzal, singulair, astelin, patanol as needed for symptoms  Follow up in 12 months for recheck, sooner if symptoms worsen or concerns develop    Trina Cerna NP-C  10:13 AM  June 12, 2020    Phone call duration: 9 minutes          Again, thank you for allowing me to participate in the care of your patient.        Sincerely,        Trina Cerna, NP

## 2020-07-10 ENCOUNTER — OFFICE VISIT (OUTPATIENT)
Dept: FAMILY MEDICINE | Facility: OTHER | Age: 25
End: 2020-07-10
Attending: FAMILY MEDICINE
Payer: COMMERCIAL

## 2020-07-10 ENCOUNTER — NURSE TRIAGE (OUTPATIENT)
Dept: FAMILY MEDICINE | Facility: OTHER | Age: 25
End: 2020-07-10

## 2020-07-10 DIAGNOSIS — Z20.822 EXPOSURE TO COVID-19 VIRUS: Primary | ICD-10-CM

## 2020-07-10 PROCEDURE — U0003 INFECTIOUS AGENT DETECTION BY NUCLEIC ACID (DNA OR RNA); SEVERE ACUTE RESPIRATORY SYNDROME CORONAVIRUS 2 (SARS-COV-2) (CORONAVIRUS DISEASE [COVID-19]), AMPLIFIED PROBE TECHNIQUE, MAKING USE OF HIGH THROUGHPUT TECHNOLOGIES AS DESCRIBED BY CMS-2020-01-R: HCPCS | Performed by: FAMILY MEDICINE

## 2020-07-10 NOTE — TELEPHONE ENCOUNTER
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     Patient is asymptomatic. See exposure protocol     Daniel testing has been scheduled:    Next 5 appointments (look out 90 days)    Jul 10, 2020  4:10 PM CDT  (Arrive by 3:55 PM)  SHORT with  [facility]  [facility] [place] ([facility] [place] ) [address]  [place] [address]  [phone]            Home Care Advise provided with the exception to quarantine x 14 days from date of exposure. (per [facility] Guidelines)    Discharge Instructions for COVID-19 Patients  You have--or may have--COVID-19. Please follow the instructions listed below.   If you have a weakened immune system, discuss with your doctor any other actions you need to take.  How can I protect others?  If you have symptoms (fever, cough, body aches or trouble breathing):    Stay home and away from others (self-isolate) until:  ? At least 10 days have passed since your symptoms started. And   ? You've had no fever--and no medicine that reduces fever--for 3 full days (72 hours). And   ? Your other symptoms have resolved (gotten better).  If you don't show symptoms, but testing showed that you have COVID-19:    Stay home and away from others (self-isolate) until at least 10 days have passed since the date of your first positive COVID-19 test.  During this time    Stay in your own room, even for meals. Use your own bathroom if you can.    Stay away from others in your home. No hugging, kissing or shaking hands. No visitors.    Don't go to work, school or anywhere else.    Clean \"high touch\" surfaces often (doorknobs, counters, handles). Use household cleaning spray or wipes. You'll find a full list of  on the EPA website: www.epa.gov/pesticide-registration/list-n-disinfectants-use-against-sars-cov-2.    Cover your mouth and nose with a mask, tissue or wash cloth to avoid spreading germs.    Wash your hands and face " often. Use soap and water.    Caregivers in these groups are at risk for severe illness due to COVID-19:  ? People 65 years and older  ? People who live in a nursing home or long-term care facility  ? People with chronic disease (lung, heart, cancer, diabetes, kidney, liver, immunologic)  ? People who have a weakened immune system, including those who:    Are in cancer treatment    Take medicine that weakens the immune system, such as corticosteroids    Had a bone marrow or organ transplant    Have an immune deficiency    Have poorly controlled HIV or AIDS    Are obese (body mass index of 40 or higher)    Smoke regularly    Caregivers should wear gloves while washing dishes, handling laundry and cleaning bedrooms and bathrooms.    Use caution when washing and drying laundry: Don't shake dirty laundry and use the warmest water setting that you can.    For more tips on managing your health at home, go to www.cdc.gov/coronavirus/2019-ncov/downloads/10Things.pdf.  How can I take care of myself at home?  1. Get lots of rest. Drink extra fluids (unless a doctor has told you not to).  2. Take Tylenol (acetaminophen) for fever or pain. If you have liver or kidney problems, ask your family doctor if it's okay to take Tylenol.     Adults can take either:  ? 650 mg (two 325 mg pills) every 4 to 6 hours, or   ? 1,000 mg (two 500 mg pills) every 8 hours as needed.  ? Note: Don't take more than 3,000 mg in one day. Acetaminophen is found in many medicines (both prescribed and over-the-counter medicines). Read all labels to be sure you don't take too much.   For children, check the Tylenol bottle for the right dose. The dose is based on the child's age or weight.  3. If you have other health problems (like cancer, heart failure, an organ transplant or severe kidney disease): Call your specialty clinic if you don't feel better in the next 2 days.  4. Know when to call 911. Emergency warning signs include:  ? Trouble breathing or  shortness of breath  ? Pain or pressure in the chest that doesn't go away  ? Feeling confused like you haven't felt before, or not being able to wake up  ? Bluish-colored lips or face  5. Your doctor may have prescribed a blood thinner medicine. Follow their instructions.  Where can I get more information?     [email or web address] [place] - About COVID-19:   [email or web address]    CDC - What to Do If You're Sick: www.cdc.gov/coronavirus/2019-ncov/about/steps-when-sick.html    CDC - Ending Home Isolation: www.cdc.gov/coronavirus/2019-ncov/hcp/disposition-in-home-patients.html    [facility] - Caring for Someone: www.cdc.gov/coronavirus/2019-ncov/if-you-are-sick/care-for-someone.html    [facility] - Interim Guidance for Hospital Discharge to Home: www.health.[facility].[place]/diseases/coronavirus/hcp/hospdischarge.pdf    [facility] clinical trials (COVID-19 research studies): clinicalaffairs.[employer].[facility]/[employer]-clinical-trials    Below are the COVID-19 hotlines at the [place] Department of Health ([facility]). Interpreters are available.  ? For health questions: Call 975-328-8091 or 1-180.745.3015 (7 a.m. to 7 p.m.)  ? For questions about schools and childcare: Call 209-803-9372 or 1-267.977.6046 (7 a.m. to 7 p.m.)    For informational purposes only. Not to replace the advice of your health care provider. Clinically reviewed by the Infection Prevention Team.Copyright   2020 [employer]. All rights reserved. Ofelia Feliz [id number] - 06/20.   19    Reason for Disposition    [1] COVID-19 EXPOSURE (Close Contact) AND [2] within last 14 days BUT [3] NO symptoms    Additional Information    Negative: COVID-19 has been diagnosed by a healthcare provider (HCP)    Negative: COVID-19 lab test positive    Negative: [1] Symptoms of COVID-19 (e.g., cough, fever, SOB, or others) AND [2] lives in an area with community spread    Negative: [1] Symptoms of COVID-19 (e.g., cough, fever, SOB, or others) AND [2] within 14 days of EXPOSURE (close  contact) with diagnosed or suspected COVID-19 patient    Negative: [1] Symptoms of COVID-19 (e.g., cough, fever, SOB, or others) AND [2] within 14 days of travel from high-risk area for COVID-19 community spread (identified by [facility])    Negative: [1] Difficulty breathing (shortness of breath) occurs AND [2] onset > 14 days after COVID-19 EXPOSURE (Close Contact) AND [3] no community spread where patient lives    Negative: [1] Dry cough occurs AND [2] onset > 14 days after COVID-19 EXPOSURE AND [3] no community spread where patient lives    Negative: [1] Wet cough (i.e., white-yellow, yellow, green, or manish colored sputum) AND [2] onset > 14 days after COVID-19 EXPOSURE AND [3] no community spread where patient lives    Negative: [1] Common cold symptoms AND [2] onset > 14 days after COVID-19 EXPOSURE AND [3] no community spread where patient lives    Negative: [1] COVID-19 EXPOSURE (Close Contact) within last 14 days AND [2] needs COVID-19 lab test to return to work AND [3] NO symptoms    Negative: [1] COVID-19 EXPOSURE (Close Contact) within last 14 days AND [2] 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 recommended personal protective equipment (i.e., a respirator-N95 mask, eye protection, gloves, and gown) AND [3] NO symptoms    Negative: [1] COVID-19 EXPOSURE AND [2] 15 or more days ago AND [3] NO symptoms    Negative: [1] Living in area with community spread (identified by local PHD) BUT [2] NO symptoms    Negative: [1] Travel from area with community spread (identified by CDC) AND [2] within last 14 days BUT [3] NO symptoms    Negative: [1] No COVID-19 EXPOSURE BUT [2] living with someone who was exposed and who has no symptoms of COVID-19    Negative: [1] Caller concerned that exposure to COVID-19 occurred BUT [2] does not meet COVID-19 EXPOSURE criteria from CDC    Negative: COVID-19 Testing, questions about    Negative: COVID-19 Prevention and Healthy Living, questions about    Negative:  "COVID -19 Disease, questions about    Answer Assessment - Initial Assessment Questions  1. 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 TYPE of CONTACT: \"How much contact was there?\" (e.g., sitting next to, live in same house, work in same office, same building)      Waiting on the customer , same building     4. DURATION of CONTACT: \"How long were you in contact with the COVID-19 patient?\" (e.g., a few seconds, passed by person, a few minutes, live with the patient)      During the night of 7/2/20 and 7/3/20    5. DATE of CONTACT: \"When did you have contact with a COVID-19 patient?\" (e.g., how many days ago)      7/2/20 and 7/3/20    6. TRAVEL: \"Have you traveled out of the country recently?\" If so, \"When and where?\"      * Also ask about out-of-state travel, since the CDC has identified some high-risk cities for community spread in the .      * Note: Travel becomes less relevant if there is widespread community transmission where the patient lives.      No     7. COMMUNITY SPREAD: \"Are there lots of cases of COVID-19 (community spread) where you live?\" (See public health department website, if unsure)        Yes    8. SYMPTOMS: \"Do you have any symptoms?\" (e.g., fever, cough, breathing difficulty)      No     9. PREGNANCY OR POSTPARTUM: \"Is there any chance you are pregnant?\" \"When was your last menstrual period?\" \"Did you deliver in the last 2 weeks?\"      No     10. HIGH RISK: \"Do you have any heart or lung problems? Do you have a weak immune system?\" (e.g., CHF, COPD, asthma, HIV positive, chemotherapy, renal failure, diabetes mellitus, sickle cell anemia)        Hypothyroidism    Protocols used: " CORONAVIRUS (COVID-19) EXPOSURE-A- 5.16.20

## 2020-07-10 NOTE — LETTER
July 17, 2020        Hoang Sanchezramos  [address] [place] [address]    This letter provides a written record that you were tested for COVID-19 on 7/10/20.       Your result was negative. This means that we didn t find the virus that causes COVID-19 in your sample. A test may show negative when you do actually have the virus. This can happen when the virus is in the early stages of infection, before you feel illness symptoms.    If you have symptoms   Stay home and away from others (self-isolate) until you meet ALL of the guidelines below:    You ve had no fever--and no medicine that reduces fever--for 3 full days (72 hours). And      Your other symptoms have gotten better. For example, your cough or breathing has improved. And     At least 10 days have passed since your symptoms started.    During this time:    Stay home. Don t go to work, school or anywhere else.     Stay in your own room, including for meals. Use your own bathroom if you can.    Stay away from others in your home. No hugging, kissing or shaking hands. No visitors.    Clean  high touch  surfaces often (doorknobs, counters, handles, etc.). Use a household cleaning spray or wipes. You can find a full list on the EPA website at www.epa.gov/pesticide-registration/list-n-disinfectants-use-against-sars-cov-2.    Cover your mouth and nose with a mask, tissue or washcloth to avoid spreading germs.    Wash your hands and face often with soap and water.    Going back to work  Check with your employer for any guidelines to follow for going back to work.    Employers: This document serves as formal notice that your employee tested negative for COVID-19, as of the testing date shown above.

## 2020-07-13 LAB
SARS-COV-2 RNA SPEC QL NAA+PROBE: NOT DETECTED
SPECIMEN SOURCE: NORMAL

## 2020-10-20 ENCOUNTER — TELEPHONE (OUTPATIENT)
Dept: ALLERGY | Facility: OTHER | Age: 25
End: 2020-10-20

## 2020-10-20 NOTE — TELEPHONE ENCOUNTER
Spoke with patient regarding SLIT refill.  Patient is not having issues on the drops, and her epi-pens are current.  Patient is not due for a follow-up at this time.  Will process refill request.    Crys Olivier RN

## 2021-04-14 ENCOUNTER — TELEPHONE (OUTPATIENT)
Dept: ALLERGY | Facility: OTHER | Age: 26
End: 2021-04-14

## 2021-04-14 DIAGNOSIS — J31.0 CHRONIC RHINITIS: ICD-10-CM

## 2022-10-17 ENCOUNTER — TRANSFERRED RECORDS (OUTPATIENT)
Dept: HEALTH INFORMATION MANAGEMENT | Facility: HOSPITAL | Age: 27
End: 2022-10-17